# Patient Record
Sex: MALE | Race: WHITE | Employment: STUDENT | URBAN - NONMETROPOLITAN AREA
[De-identification: names, ages, dates, MRNs, and addresses within clinical notes are randomized per-mention and may not be internally consistent; named-entity substitution may affect disease eponyms.]

---

## 2018-02-13 ENCOUNTER — HOSPITAL ENCOUNTER (EMERGENCY)
Age: 23
Discharge: HOME | End: 2018-02-13
Attending: EMERGENCY MEDICINE
Payer: COMMERCIAL

## 2018-02-13 VITALS
OXYGEN SATURATION: 99 % | TEMPERATURE: 99.2 F | HEART RATE: 91 BPM | WEIGHT: 220 LBS | DIASTOLIC BLOOD PRESSURE: 73 MMHG | RESPIRATION RATE: 16 BRPM | SYSTOLIC BLOOD PRESSURE: 124 MMHG

## 2018-02-13 DIAGNOSIS — A09 ACUTE INFECTIVE GASTROENTERITIS: Primary | ICD-10-CM

## 2018-02-13 LAB
BILIRUBIN URINE: ABNORMAL
BLOOD, URINE: NEGATIVE
CHARACTER, URINE: ABNORMAL
COLOR: ABNORMAL
GLUCOSE, URINE: NEGATIVE MG/DL
KETONES, URINE: ABNORMAL
LEUKOCYTES, UA: NEGATIVE
NITRATE, UA: NEGATIVE
PH UA: 6 (ref 5–9)
PROTEIN UA: ABNORMAL MG/DL
REFLEX TO URINE C & S: ABNORMAL
SPECIFIC GRAVITY UA: 1.02 (ref 1–1.03)
UROBILINOGEN, URINE: 4 EU/DL (ref 0–1)

## 2018-02-13 PROCEDURE — 99204 OFFICE O/P NEW MOD 45 MIN: CPT

## 2018-02-13 PROCEDURE — 81003 URINALYSIS AUTO W/O SCOPE: CPT

## 2018-02-13 PROCEDURE — 99203 OFFICE O/P NEW LOW 30 MIN: CPT | Performed by: EMERGENCY MEDICINE

## 2018-02-13 RX ORDER — ONDANSETRON 4 MG/1
4 TABLET, ORALLY DISINTEGRATING ORAL 4 TIMES DAILY PRN
Qty: 10 TABLET | Refills: 0 | Status: SHIPPED | OUTPATIENT
Start: 2018-02-13

## 2018-02-13 ASSESSMENT — ENCOUNTER SYMPTOMS
COUGH: 0
BACK PAIN: 0
SHORTNESS OF BREATH: 0
WHEEZING: 0
STRIDOR: 0
EYE REDNESS: 0
SINUS PRESSURE: 0
EYE PAIN: 0
VOMITING: 1
VOICE CHANGE: 0
DIARRHEA: 1
ABDOMINAL PAIN: 1
NAUSEA: 1
EYE DISCHARGE: 0
RHINORRHEA: 1
SORE THROAT: 0
TROUBLE SWALLOWING: 0

## 2018-02-13 NOTE — ED NOTES
Pt. Released in stable condition, ambulated per self to private car. Instructed pt to follow-up with family doctor as needed for recheck or go directly to the emergency department for any concerns/worsening conditions. Pt. Verbalized understanding of instructions. No questions at this time. RX in hand.       Cathi Shepherd RN  02/13/18 [id number]